# Patient Record
Sex: MALE | Race: BLACK OR AFRICAN AMERICAN
[De-identification: names, ages, dates, MRNs, and addresses within clinical notes are randomized per-mention and may not be internally consistent; named-entity substitution may affect disease eponyms.]

---

## 2019-12-23 ENCOUNTER — HOSPITAL ENCOUNTER (EMERGENCY)
Dept: HOSPITAL 74 - JERFT | Age: 34
Discharge: HOME | End: 2019-12-23
Payer: COMMERCIAL

## 2019-12-23 VITALS — DIASTOLIC BLOOD PRESSURE: 100 MMHG | HEART RATE: 88 BPM | SYSTOLIC BLOOD PRESSURE: 139 MMHG

## 2019-12-23 VITALS — TEMPERATURE: 98.1 F

## 2019-12-23 VITALS — BODY MASS INDEX: 24.3 KG/M2

## 2019-12-23 DIAGNOSIS — K08.89: Primary | ICD-10-CM

## 2019-12-23 NOTE — PDOC
Rapid Medical Evaluation


Time Seen by Provider: 12/23/19 13:49


Medical Evaluation: 





12/23/19 13:51


I performed a brief in-person evaluation of this patient.





Healthy 34-year-old male under care of dentist, told that he needs tooth 

extraction of #18 tooth. Here requesting pain management.





Pertinent physical exam findings:





No swelling


No trismus


Afebrile





I have ordered the following: 


None





Patient will proceed to:


Fast track for further evaluation





**Discharge Disposition





- Diagnosis


 Dental implant pain








- Referrals





- Patient Instructions





- Post Discharge Activity

## 2019-12-23 NOTE — PDOC
History of Present Illness





- General


Chief Complaint: Toothache


Stated Complaint: DENTAL


Time Seen by Provider: 12/23/19 13:49


History Source: Patient





- History of Present Illness


Timing/Duration: other





Past History





- Past Medical History


Allergies/Adverse Reactions: 


 Allergies











Allergy/AdvReac Type Severity Reaction Status Date / Time


 


No Known Allergies Allergy   Verified 12/23/19 13:50











COPD: No





- Psycho Social/Smoking Cessation Hx


Smoking History: Never smoked


Have you smoked in the past 12 months: No


Hx Alcohol Use: No


Drug/Substance Use Hx: No





**Review of Systems





- Review of Systems


Constitutional: No: Chills, Fever





*Physical Exam





- Vital Signs


 Last Vital Signs











Temp Pulse Resp BP Pulse Ox


 


 98.1 F   74   18   143/102 H  100 


 


 12/23/19 13:51  12/23/19 13:51  12/23/19 13:51  12/23/19 13:51  12/23/19 13:51














- Physical Exam


General Appearance: Yes: Appropriately Dressed, Apparent Distress


HEENT: positive: Other (minimal ttp/swelling to L lower molar gumline, no 

discharge, no facial swelling).  negative: Normal Voice


Neck: positive: Supple


Respiratory/Chest: negative: Respiratory Distress


Integumentary: positive: Dry, Warm


Neurologic: positive: Fully Oriented, Alert, Normal Mood/Affect





Medical Decision Making





- Medical Decision Making





12/23/19 14:11





34-year-old male, no significant history, here with dental pain x4 days.  

States he went to see his dentist today who referred him to oral surgery for 

possible extraction but states office for oral surgeon is closed so decided to 

come to ED to see a dentist.  Has not taken anything for pain.  No facial 

swelling, fever or chills





see exam





Dental pain


Referred by dentist to oral surgery for possible extraction per patient


No evidence of overt infection or abscess


-dc with pain control, patient to follow-up with oral surgery











Discharge





- Discharge Information


Problems reviewed: Yes


Clinical Impression/Diagnosis: 


 Pain, dental





Condition: Good


Disposition: HOME





- Follow up/Referral





- Patient Discharge Instructions


Patient Printed Discharge Instructions:  DI for Dental Pain


Additional Instructions: 


Take 800 of Motrin every 6 hours as needed for pain and continue to follow-up 

with your oral surgeon





- Post Discharge Activity